# Patient Record
Sex: FEMALE
[De-identification: names, ages, dates, MRNs, and addresses within clinical notes are randomized per-mention and may not be internally consistent; named-entity substitution may affect disease eponyms.]

---

## 2019-12-10 ENCOUNTER — HOSPITAL ENCOUNTER (INPATIENT)
Dept: HOSPITAL 5 - 2B-ACE | Age: 69
LOS: 3 days | Discharge: HOME HEALTH | DRG: 374 | End: 2019-12-13
Attending: INTERNAL MEDICINE | Admitting: INTERNAL MEDICINE
Payer: MEDICARE

## 2019-12-10 DIAGNOSIS — E43: ICD-10-CM

## 2019-12-10 DIAGNOSIS — K59.00: ICD-10-CM

## 2019-12-10 DIAGNOSIS — J44.9: ICD-10-CM

## 2019-12-10 DIAGNOSIS — Z82.49: ICD-10-CM

## 2019-12-10 DIAGNOSIS — D50.8: ICD-10-CM

## 2019-12-10 DIAGNOSIS — C18.9: Primary | ICD-10-CM

## 2019-12-10 DIAGNOSIS — F17.210: ICD-10-CM

## 2019-12-10 DIAGNOSIS — E78.5: ICD-10-CM

## 2019-12-10 DIAGNOSIS — C56.9: ICD-10-CM

## 2019-12-10 DIAGNOSIS — I10: ICD-10-CM

## 2019-12-10 DIAGNOSIS — Z90.49: ICD-10-CM

## 2019-12-10 PROCEDURE — 36415 COLL VENOUS BLD VENIPUNCTURE: CPT

## 2019-12-10 PROCEDURE — 86304 IMMUNOASSAY TUMOR CA 125: CPT

## 2019-12-10 PROCEDURE — 80053 COMPREHEN METABOLIC PANEL: CPT

## 2019-12-10 PROCEDURE — 90686 IIV4 VACC NO PRSV 0.5 ML IM: CPT

## 2019-12-10 PROCEDURE — 80048 BASIC METABOLIC PNL TOTAL CA: CPT

## 2019-12-10 PROCEDURE — 87116 MYCOBACTERIA CULTURE: CPT

## 2019-12-10 PROCEDURE — 85610 PROTHROMBIN TIME: CPT

## 2019-12-10 PROCEDURE — 85025 COMPLETE CBC W/AUTO DIFF WBC: CPT

## 2019-12-10 PROCEDURE — 82378 CARCINOEMBRYONIC ANTIGEN: CPT

## 2019-12-10 PROCEDURE — 88305 TISSUE EXAM BY PATHOLOGIST: CPT

## 2019-12-10 PROCEDURE — 84134 ASSAY OF PREALBUMIN: CPT

## 2019-12-10 PROCEDURE — 85730 THROMBOPLASTIN TIME PARTIAL: CPT

## 2019-12-10 PROCEDURE — 74177 CT ABD & PELVIS W/CONTRAST: CPT

## 2019-12-10 PROCEDURE — 86301 IMMUNOASSAY TUMOR CA 19-9: CPT

## 2019-12-11 LAB
ALBUMIN SERPL-MCNC: 3.3 G/DL (ref 3.9–5)
ALT SERPL-CCNC: 72 UNITS/L (ref 7–56)
BASOPHILS # (AUTO): 0.1 K/MM3 (ref 0–0.1)
BASOPHILS NFR BLD AUTO: 0.9 % (ref 0–1.8)
BUN SERPL-MCNC: 11 MG/DL (ref 7–17)
BUN/CREAT SERPL: 28 %
CALCIUM SERPL-MCNC: 9.2 MG/DL (ref 8.4–10.2)
EOSINOPHIL # BLD AUTO: 0.2 K/MM3 (ref 0–0.4)
EOSINOPHIL NFR BLD AUTO: 1.7 % (ref 0–4.3)
HCT VFR BLD CALC: 37 % (ref 30.3–42.9)
HEMOLYSIS INDEX: 18
HGB BLD-MCNC: 11.7 GM/DL (ref 10.1–14.3)
INR PPP: 1.08 (ref 0.87–1.13)
LYMPHOCYTES # BLD AUTO: 2.8 K/MM3 (ref 1.2–5.4)
LYMPHOCYTES NFR BLD AUTO: 30.3 % (ref 13.4–35)
MCHC RBC AUTO-ENTMCNC: 32 % (ref 30–34)
MCV RBC AUTO: 75 FL (ref 79–97)
MONOCYTES # (AUTO): 0.6 K/MM3 (ref 0–0.8)
MONOCYTES % (AUTO): 6.5 % (ref 0–7.3)
PLATELET # BLD: 453 K/MM3 (ref 140–440)
PREALB SERPL-MCNC: 0.12 G/L (ref 0.2–0.4)
RBC # BLD AUTO: 4.91 M/MM3 (ref 3.65–5.03)

## 2019-12-11 PROCEDURE — 0DBN8ZX EXCISION OF SIGMOID COLON, VIA NATURAL OR ARTIFICIAL OPENING ENDOSCOPIC, DIAGNOSTIC: ICD-10-PCS | Performed by: INTERNAL MEDICINE

## 2019-12-11 RX ADMIN — Medication SCH ML: at 09:22

## 2019-12-11 RX ADMIN — SODIUM CHLORIDE SCH MLS/HR: 0.9 INJECTION, SOLUTION INTRAVENOUS at 13:40

## 2019-12-11 RX ADMIN — MORPHINE SULFATE PRN MG: 2 INJECTION, SOLUTION INTRAMUSCULAR; INTRAVENOUS at 22:35

## 2019-12-11 RX ADMIN — Medication SCH ML: at 22:36

## 2019-12-11 NOTE — OPERATIVE REPORT
Operative Report


Operative Report: 





Flexible sigmoidoscopy:





Procedure: Flexible sigmoidoscopy with biopsies





Endoscopist: Alexei Stewart MD





Pre-operative Diagnosis/Indications:abnormal CT, weight loss





Post-operative Diagnosis: distal sigmoid mass, poor prep





History:See consult note





Sedation:MAC





Procedure Details: Indications, risks, and benefits were explained and consent 

was obtained. Pt was placed in the left lateral decubitus position and sedated.

Video colonoscope was inserted thru the anus after digital exam, and advanced 

to the distal sigmoid colon. Scope was then gradually withdrawn with close 

inspection of the mucosa. Prep was poor.





Findings:


1. There was an obstructing mass-like lesion in the distal sigmoid colon at 

about 20 cm. Despite multiple attempts, the scope could not be advanced beyond 

this area. A complete visualization of the mass lesion could not be obtained due

to thicken surrounding mucosa and prep quality. Biopsies were obtained. The 

adjacent mucosa was marked with tattoo. 


2. Nonbleeding small internal hemorrhoids seen on retroflexion view.  





Specimens:sigmoid colon mass biopsies.





Complications:None; patient tolerated the procedure well.





Disposition:Recover in the GI endoscopy unit and transfer to the floor.





Impression:


1. There was an obstructing mass-like lesion in the distal sigmoid colon at 

about 20 cm. Despite multiple attempts, the scope could not be advanced beyond 

this area. A complete visualization of the mass lesion could not be obtained due

to thicken surrounding mucosa and prep quality. Biopsies were obtained. The 

adjacent mucosa was marked with tattoo. This lesion is concerning for malignancy

with possible mets as reported on CT done at [place]. Do not feel that there is 

complete obstruction due to this lesion.  


2. Nonbleeding small internal hemorrhoids seen on retroflexion view.  





Recommendations:


1. Resume clear liquid diet. 


2. Follow up pathology results. 


3. Discussed with surgery. Will place a consult. 


4. May need repeat or additional imaging if no access to the disc.

## 2019-12-11 NOTE — ANESTHESIA CONSULTATION
Anesthesia Consult and Med Hx


Date of service: 12/11/19





- Airway


Anesthetic Teeth Evaluation: Poor


ROM Head & Neck: Adequate


Mental/Hyoid Distance: Adequate


Mallampati Class: Class I


Intubation Access Assessment: Good





- Pulmonary Exam


CTA: Yes





- Cardiac Exam


Cardiac Exam: RRR





- Pre-Operative Health Status


ASA Pre-Surgery Classification: ASA3


Proposed Anesthetic Plan: MAC





- Pulmonary


Hx Smoking: Yes


COPD: Yes





- Cardiovascular System


Hx Hypertension: Yes





- Central Nervous System


Hx Neuromuscular Disorder: No


Hx Psychiatric Problems: No





- Gastrointestinal


Hx Gastroesophageal Reflux Disease: No





- Endocrine


Hx Renal Disease: No





- Other Systems


Hx Alcohol Use: No


Hx Substance Use: No


Hx Cancer: Yes (Ovarian Ca)





- Additional Comments


Anesthesia Medical History Comments: Patient denied previous anesthesia related 

complications.

## 2019-12-11 NOTE — CONSULTATION
History of Present Illness


Consult date: 12/11/19


Reason for consult: abdominal pain


Chief complaint: 





abdominal pain





- History of present illness


History of present illness: 





68 yo F with hx of presented to [facility] with weight loss, abdominal pain,

fatigue. Patient states she has been having periumbilical and right sided 

abdominal pain for several months. She has lost 40 lbs and has no appetite. She 

has one episode of emesis prior to presenting to [facility]. Her last solid BM was 

last Friday. At [facility], she underwent w/u with labs including CEA,  which 

were reported as high. She also had a CT scan A/P which was concerning for a 

necrotic right adnexal mass, thickening of the sigmoid colon, and a liver 

lesion. This was felt to be worrisome for metasatic ovarian ca. Per records, the

liver lesion wad felt to be artifact due to pleural thickening and adnexal 

lesion was not accessible by IR biopsy due to location. Colonoscopy was planned.

Pt was sent to [facility] for further care. She underwent colonoscopy today and was 

found to have a sigmoid mass that was not obstructing but could not be passed 

with the scope. Surgery is consulted for evaluation. Pt was able to tolerate a 

prep for cscope. She denies hematochezia, melena, hematemesis





Past History


Past Medical History: COPD, hypertension


Past Surgical History: appendectomy


Social history: smoking (Smokes less than a pack of cigarette daily)


Family history: other (Heart disease in mother)





Medications and Allergies


                                    Allergies











Allergy/AdvReac Type Severity Reaction Status Date / Time


 


No Known Allergies Allergy   Verified 12/11/19 05:57











                                Home Medications











 Medication  Instructions  Recorded  Confirmed  Last Taken  Type


 


Aspirin EC [Halfprin EC] 81 mg DAILY 12/11/19 12/11/19 Unknown History


 


AtorvaSTATin [Lipitor] 10 mg PO QHS 12/11/19 12/11/19 Unknown History


 


Chlorthalidone 50 mg DAILY 12/11/19 12/11/19 Unknown History











Active Meds: 


Active Medications





Acetaminophen (Tylenol)  650 mg PO Q4H PRN


   PRN Reason: Pain MILD(1-3)/Fever >100.5/HA


Sodium Chloride (Nacl 0.9% 1000 Ml)  1,000 mls @ 75 mls/hr IV AS DIRECT KEYLA


   Last Admin: 12/11/19 13:40 Dose:  75 mls/hr


   Documented by: 


Sodium Chloride (Nacl 0.9% 1000 Ml)  1,000 mls @ 50 mls/hr IV AS DIRECT KEYLA


Magnesium Citrate (Citrate Of Magnesia)  300 ml PO ONCE NR


   Stop: 12/12/19 14:00


   Last Admin: 12/11/19 11:26 Dose:  300 ml


   Documented by: 


Morphine Sulfate (Morphine)  2 mg IV Q4H PRN


   PRN Reason: Pain, Moderate (4-6)


Ondansetron HCl (Zofran)  4 mg IV Q8H PRN


   PRN Reason: Nausea And Vomiting


Sodium Chloride (Sodium Chloride Flush Syringe 10 Ml)  10 ml IV BID KEYLA


   Last Admin: 12/11/19 09:22 Dose:  10 ml


   Documented by: 


Sodium Chloride (Sodium Chloride Flush Syringe 10 Ml)  10 ml IV PRN PRN


   PRN Reason: LINE FLUSH











Review of Systems


All systems: negative (10 PT ROS performed and negative except for that listed 

in HPI)





Exam


                                   Vital Signs











Temp Pulse Resp BP Pulse Ox


 


 98.9 F   83   18   147/87   99 


 


 12/11/19 00:37  12/11/19 00:37  12/11/19 00:37  12/11/19 00:37  12/11/19 00:37











Narrative exam: 





Gen: AAOx3. NAD. Cachectic with temporal wasting


ENT: no scleral icterus or conjunctival pallor


CV: s1, S2+. tachy


Resp: even and unlabored


Abd: soft, mildly distended, R sided and periumbilical TTP. No r/r/g


Ext: no c/c/e





Assessment and Plan





68 yo F with sigmoid colon mass, possible metastatic disease








Plan:





All records from [facility] Reviewed. No CD available to review imaging.





1. Recommend repeat CT scan A/P with oral and IV contrast


2. Obtain CBC, BMP, CEA ,, 


[facility]. [facility]


4. nutrition consult


5. Further recs pending repeat imaging





Thank you, please call with questions.

## 2019-12-11 NOTE — PROGRESS NOTE
Subjective


Date of service: 12/11/19





Objective





- Constitutional


Vitals: 


                               Vital Signs - 12hr











  12/11/19 12/11/19 12/11/19





  12:36 13:00 13:30


 


Temperature  98.5 F 98.5 F


 


Pulse Rate  87 87


 


Respiratory  24 24





Rate   


 


Blood Pressure  148/89 148/89


 


O2 Sat by Pulse 99 99 99





Oximetry   














  12/11/19 12/11/19 12/11/19





  15:02 15:16 15:23


 


Temperature 97.5 F L  


 


Pulse Rate 73 78 73


 


Respiratory 19 13 14





Rate   


 


Blood Pressure 151/71 167/81 165/85


 


O2 Sat by Pulse 100 99 99





Oximetry   














  12/11/19 12/11/19 12/11/19





  15:32 15:55 16:01


 


Temperature   


 


Pulse Rate 80  77


 


Respiratory 24 15 13





Rate   


 


Blood Pressure 160/86  169/91


 


O2 Sat by Pulse 99  99





Oximetry   














  12/11/19 12/11/19





  16:06 16:19


 


Temperature  


 


Pulse Rate 76 78


 


Respiratory 18 20





Rate  


 


Blood Pressure 138/73 130/78


 


O2 Sat by Pulse 98 98





Oximetry

## 2019-12-11 NOTE — HISTORY AND PHYSICAL REPORT
History of Present Illness


Date of examination: 12/11/19


Date of admission: 


12/11/19 00:27





Chief complaint: 





Constipation and abdominal pain


History of present illness: 





69-year-old -American female with known history of hypertension 

hyperlipidemia COPD was transferred from [facility] today having been 

admitted for unintentional 40 pounds weight loss over a period of 1 month.  She 

had also complaining of abdominal and rectal pain and also constipation for 

about a week.


She has been prepared for colonoscopy and was subsequently transferred to this 

facility from [facility] for further care.  She currently denies any 

problems, denies any abdominal pain, no nausea vomiting.





Past History


Past Medical History: COPD, hypertension


Past Surgical History: appendectomy


Social history: smoking (Smokes less than a pack of cigarette daily)


Family history: other (Heart disease in mother)





Medications and Allergies


                                    Allergies











Allergy/AdvReac Type Severity Reaction Status Date / Time


 


No Known Allergies Allergy   Verified 12/11/19 05:57











Active Meds: 


Active Medications





Acetaminophen (Tylenol)  650 mg PO Q4H PRN


   PRN Reason: Pain MILD(1-3)/Fever >100.5/HA


Sodium Chloride (Nacl 0.9% 1000 Ml)  1,000 mls @ 75 mls/hr IV AS DIRECT KEYLA


Morphine Sulfate (Morphine)  2 mg IV Q4H PRN


   PRN Reason: Pain, Moderate (4-6)


Ondansetron HCl (Zofran)  4 mg IV Q8H PRN


   PRN Reason: Nausea And Vomiting


Sodium Chloride (Sodium Chloride Flush Syringe 10 Ml)  10 ml IV BID KEYLA


Sodium Chloride (Sodium Chloride Flush Syringe 10 Ml)  10 ml IV PRN PRN


   PRN Reason: LINE FLUSH











Review of Systems


Constitutional: weight loss


Gastrointestinal: constipation





Exam





- Constitutional


Vitals: 


                                        











Temp Pulse Resp BP Pulse Ox


 


 98.9 F   83   18   147/87   99 


 


 12/11/19 00:37  12/11/19 00:37  12/11/19 00:37  12/11/19 00:37  12/11/19 00:37











General appearance: Present: no acute distress, cachectic





- EENT


Eyes: Present: PERRL, EOM intact


ENT: hearing intact, clear oral mucosa





- Neck


Neck: Present: supple, normal ROM





- Respiratory


Respiratory: bilateral: CTA





- Cardiovascular


Rhythm: regular


Heart Sounds: Present: S1 & S2





- Extremities


Extremities: no ischemia, pulses intact, No edema, Full ROM


Peripheral Pulses: within normal limits





- Abdominal


General gastrointestinal: Present: soft, non-tender, non-distended





- Integumentary


Integumentary: Present: clear, warm, dry





- Musculoskeletal


Musculoskeletal: strength equal bilaterally





- Psychiatric


Psychiatric: appropriate mood/affect, intact judgment & insight, cooperative





- Neurologic


Neurologic: CNII-XII intact, moves all extremities





Assessment and Plan





- Patient Problems


(1) Constipation


Current Visit: Yes   Status: Acute   


Plan to address problem: 


Patient has taken some GoLYTELY and being currently prepared for colonoscopy.


We will request stat gastroenterology follow-up.








(2) Weight loss


Current Visit: Yes   Status: Acute   


Plan to address problem: 


Etiology is unclear.  We will monitor weight.


Imaging findings at [facility] was suspicious for metastatic malignancy and 

metastatic ovarian or colonic cancer.


Patient is being worked up for colonoscopy.








(3) Hypertension


Current Visit: Yes   Status: Acute   


Plan to address problem: 


Blood pressure stable.  We will monitor vital signs closely.  We will continue 

routine home medications.








(4) Full code status


Current Visit: Yes   Status: Acute

## 2019-12-11 NOTE — GASTROENTEROLOGY CONSULTATION
History of Present Illness





- Reason for Consult


Consult date: 12/11/19


constipation, wt loss, evaluate for colonoscopy


Requesting physician: STACIE WELLINGTON





- History of Present Illness


Patient is a 68 y/o female with PMH of HTN, HLD, and COPD who was transferred 

from [facility] [place] [facility] further care after being admitted for c/o abdominal 

pain, constipation, N/V, and unintentional weight loss over the past couple of 

months. Labs (NEY; CEA/CA 19-9 elevated) and Abd CT findings (adnexal lesion, 

sigmod thickening, hepatic lesion?, and adrenal lesion; CT chest with no overt 

metastatic disease) at [place] were concerning for metastatic malignancy (ovarian 

vs GI?) with patient pending further workup with a colonoscopy. This morning 

patient was resting in bed w/o acute distress. Reports continued abdominal pain 

but no current N/V. Drank 3/4 on colon prep (Golytely) overnight with last BM 

this am light watery brown (no solid stool). Denies signs of bleeding. No 

previous colonoscopy or Fhx of colon cancer. 








Past History


Past Medical History: COPD, hypertension


Past Surgical History: appendectomy


Social history: smoking (Smokes less than a pack of cigarette daily)


Family history: other (Heart disease in mother)





Medications and Allergies


                                    Allergies











Allergy/AdvReac Type Severity Reaction Status Date / Time


 


No Known Allergies Allergy   Verified 12/11/19 05:57











                                Home Medications











 Medication  Instructions  Recorded  Confirmed  Last Taken  Type


 


Aspirin EC [Halfprin EC] 81 mg DAILY 12/11/19 12/11/19 Unknown History


 


AtorvaSTATin [Lipitor] 10 mg PO QHS 12/11/19 12/11/19 Unknown History


 


Chlorthalidone 50 mg DAILY 12/11/19 12/11/19 Unknown History











Active Meds: 


Active Medications





Acetaminophen (Tylenol)  650 mg PO Q4H PRN


   PRN Reason: Pain MILD(1-3)/Fever >100.5/HA


Sodium Chloride (Nacl 0.9% 1000 Ml)  1,000 mls @ 75 mls/hr IV AS DIRECT KEYLA


Magnesium Citrate (Citrate Of Magnesia)  300 ml PO ONCE ONE


   Stop: 12/11/19 10:29


Morphine Sulfate (Morphine)  2 mg IV Q4H PRN


   PRN Reason: Pain, Moderate (4-6)


Ondansetron HCl (Zofran)  4 mg IV Q8H PRN


   PRN Reason: Nausea And Vomiting


Sodium Chloride (Sodium Chloride Flush Syringe 10 Ml)  10 ml IV BID KEYLA


   Last Admin: 12/11/19 09:22 Dose:  10 ml


   Documented by: 


Sodium Chloride (Sodium Chloride Flush Syringe 10 Ml)  10 ml IV PRN PRN


   PRN Reason: LINE FLUSH





medications reviewed/updated as required





Review of Systems





- Review of Systems


All systems: negative


Constitutional: weight loss


Gastrointestinal: abdominal pain, nausea, vomiting, constipation





Exam





- Constitutional


Vital Signs: 


                                        











Temp Pulse Resp BP Pulse Ox


 


 98.4 F   94 H  16   131/65   95 


 


 12/11/19 02:36  12/11/19 02:36  12/11/19 02:36  12/11/19 02:36  12/11/19 02:36











General appearance: no acute distress, cachectic





- EENT


Eyes: PERRL, EOM intact


ENT: hearing intact





- Respiratory


Respiratory effort: normal





- Cardiovascular


Rhythm: regular





- Gastrointestinal


General gastrointestinal: Present: soft, tender (slight TTP), distended 

(slightly), normal bowel sounds





- Integumentary


Integumentary: Present: warm, dry





- Neurologic


Neurological: alert and oriented x3





Assessment and Plan


1.abdominal pain


 2.N/V


 3.constipation


 4.iron deficiency anemia


 5.abnormal CT (concern for malignancy)





-afebrile


-H/H 11.3/36.1, ferritin 22 (12/9 at [place])-continue to monitor and transfuse as

 needed; no active signs of bleeding


-CEA/CA 19-9 elevated (at [place])


-abd CT (at [place]) showed adnexal lesion, sigmoid thickening, hepatic lesion?, 

and adrenal lesion with concerns for metastatic disease (CT chest w/o overt 

metastatic disease)


-per chart review, IR at [place] noted liver lesion too superficial and close to 

pleura for biopsy, also not convinced it is a liver lesion but could be a 

pleural thickening, and adnexal lesion appears cystic and necrotic in nature and

 would require surgical excision


-colonoscopy was recommended for further evaluation with patient given Golytely 

overnight (drank 3/4 with last BM with liquid light brown stool)


-will order repeat labs for today, give 1 bottle of mag citrate now, and 

schedule for colonoscopy this afternoon


-Keep NPO (okay for meds)


-continue supportive care


-further recommendations to follow pending colonoscopy results

## 2019-12-12 LAB
BASOPHILS # (AUTO): 0.1 K/MM3 (ref 0–0.1)
BASOPHILS NFR BLD AUTO: 0.9 % (ref 0–1.8)
BUN SERPL-MCNC: 10 MG/DL (ref 7–17)
BUN/CREAT SERPL: 25 %
CALCIUM SERPL-MCNC: 8.5 MG/DL (ref 8.4–10.2)
EOSINOPHIL # BLD AUTO: 0.2 K/MM3 (ref 0–0.4)
EOSINOPHIL NFR BLD AUTO: 3.3 % (ref 0–4.3)
HCT VFR BLD CALC: 32.8 % (ref 30.3–42.9)
HEMOLYSIS INDEX: 10
HGB BLD-MCNC: 10.6 GM/DL (ref 10.1–14.3)
LYMPHOCYTES # BLD AUTO: 2.3 K/MM3 (ref 1.2–5.4)
LYMPHOCYTES NFR BLD AUTO: 37 % (ref 13.4–35)
MCHC RBC AUTO-ENTMCNC: 32 % (ref 30–34)
MCV RBC AUTO: 75 FL (ref 79–97)
MONOCYTES # (AUTO): 0.7 K/MM3 (ref 0–0.8)
MONOCYTES % (AUTO): 10.8 % (ref 0–7.3)
PLATELET # BLD: 409 K/MM3 (ref 140–440)
RBC # BLD AUTO: 4.4 M/MM3 (ref 3.65–5.03)

## 2019-12-12 RX ADMIN — SODIUM CHLORIDE SCH MLS/HR: 0.9 INJECTION, SOLUTION INTRAVENOUS at 09:46

## 2019-12-12 RX ADMIN — Medication SCH ML: at 09:41

## 2019-12-12 RX ADMIN — Medication SCH ML: at 22:46

## 2019-12-12 RX ADMIN — MORPHINE SULFATE PRN MG: 2 INJECTION, SOLUTION INTRAMUSCULAR; INTRAVENOUS at 11:15

## 2019-12-12 RX ADMIN — SODIUM CHLORIDE SCH MLS/HR: 0.9 INJECTION, SOLUTION INTRAVENOUS at 22:45

## 2019-12-12 RX ADMIN — MORPHINE SULFATE PRN MG: 2 INJECTION, SOLUTION INTRAMUSCULAR; INTRAVENOUS at 19:43

## 2019-12-12 NOTE — PROGRESS NOTE
Assessment and Plan





68 yo F with possible sigmoid mass, right adnexal mass, weight loss





Ct scan A/P reviewed with Dr. Klein - irregular right adnexal mass, peritoneal 

and omental deposits and irrgeular thickening of sigmoid colon without 

obstruction - favoring metastatic ovarian cancer, less likely 2 primary cancers.





Plan:





1. adv to full liquid diet


2. prn pain control


3. oncology consult


4. follow up colonoscopy biopsy results


5. follow up tumor markers





At this time, no urgent surgical intervention is indicated. If patient does 

become obstructed, would consider diverting colostomy. She is NOT obstructed at 

this time.





Thank you, please call with questions.





Subjective


Date of service: 12/12/19


Narrative: 





Pt seen and examined. c/o right sided abdominal pain. No f/c, cp, sob, n/v. Has 

been tolerating clear liquid diet. Has been passing flatus and having loose BMs.





Objective


                               Vital Signs - 12hr











  12/12/19 12/12/19 12/12/19





  02:40 07:04 12:00


 


Temperature 97.6 F 98.9 F 


 


Pulse Rate  88 


 


Pulse Rate [   97 H





From Monitor]   


 


Respiratory 20 18 





Rate   


 


Blood Pressure 142/69 131/77 


 


O2 Sat by Pulse  97 98





Oximetry   














  12/12/19





  13:37


 


Temperature 98.4 F


 


Pulse Rate 96 H


 


Pulse Rate [ 





From Monitor] 


 


Respiratory 18





Rate 


 


Blood Pressure 142/73


 


O2 Sat by Pulse 97





Oximetry 














- General physical appearance


Narrative Exam: 





Gen; AAOx3. NAD


CV: S1, S2+


Resp: even and unlabored


Abd: soft, ND, mild R sided TTP. No r/r/g


Ext: no c/c/e





- Labs





                                 12/12/19 03:36





                                 12/12/19 03:36


                                 Diabetes panel











  12/11/19 12/12/19 Range/Units





  18:07 03:36 


 


Sodium  136 L  139  (137-145)  mmol/L


 


Potassium  3.9  3.5 L  (3.6-5.0)  mmol/L


 


Chloride  98.5  104.6  ()  mmol/L


 


Carbon Dioxide  21 L  25  (22-30)  mmol/L


 


BUN  11  10  (7-17)  mg/dL


 


Creatinine  0.4 L  0.4 L  (0.7-1.2)  mg/dL


 


Glucose  130 H  81  ()  mg/dL


 


Calcium  9.2  8.5  (8.4-10.2)  mg/dL


 


AST  81 H   (5-40)  units/L


 


ALT  72 H   (7-56)  units/L


 


Alkaline Phosphatase  115   ()  units/L


 


Total Protein  6.8   (6.3-8.2)  g/dL


 


Albumin  3.3 L   (3.9-5)  g/dL








                                  Calcium panel











  12/11/19 12/12/19 Range/Units





  18:07 03:36 


 


Calcium  9.2  8.5  (8.4-10.2)  mg/dL


 


Albumin  3.3 L   (3.9-5)  g/dL








                                 Pituitary panel











  12/11/19 12/12/19 Range/Units





  18:07 03:36 


 


Sodium  136 L  139  (137-145)  mmol/L


 


Potassium  3.9  3.5 L  (3.6-5.0)  mmol/L


 


Chloride  98.5  104.6  ()  mmol/L


 


Carbon Dioxide  21 L  25  (22-30)  mmol/L


 


BUN  11  10  (7-17)  mg/dL


 


Creatinine  0.4 L  0.4 L  (0.7-1.2)  mg/dL


 


Glucose  130 H  81  ()  mg/dL


 


Calcium  9.2  8.5  (8.4-10.2)  mg/dL








                                  Adrenal panel











  12/11/19 12/12/19 Range/Units





  18:07 03:36 


 


Sodium  136 L  139  (137-145)  mmol/L


 


Potassium  3.9  3.5 L  (3.6-5.0)  mmol/L


 


Chloride  98.5  104.6  ()  mmol/L


 


Carbon Dioxide  21 L  25  (22-30)  mmol/L


 


BUN  11  10  (7-17)  mg/dL


 


Creatinine  0.4 L  0.4 L  (0.7-1.2)  mg/dL


 


Glucose  130 H  81  ()  mg/dL


 


Calcium  9.2  8.5  (8.4-10.2)  mg/dL


 


Total Bilirubin  0.50   (0.1-1.2)  mg/dL


 


AST  81 H   (5-40)  units/L


 


ALT  72 H   (7-56)  units/L


 


Alkaline Phosphatase  115   ()  units/L


 


Total Protein  6.8   (6.3-8.2)  g/dL


 


Albumin  3.3 L   (3.9-5)  g/dL

## 2019-12-12 NOTE — PROGRESS NOTE
Assessment and Plan


Assessment and plan: 





Abdominal pain.  Abd CT (at [facility]) showed adnexal lesion, sigmoid thickening, 

hepatic lesion?, and adrenal lesion with concerns for metastatic disease (CT 

chest w/o overt metastatic disease).  Colonoscopy revealed an obstructing mass-

like lesion in the distal sigmoid colon at about 20 cm. Despite multiple 

attempts, the scope could not be advanced beyond this area. A complete visu

alization of the mass lesion could not be obtained due to thicken surrounding 

mucosa and prep quality. Biopsies were obtained. The adjacent mucosa was marked 

with tattoo. This lesion is concerning for malignancy with possible mets as 

reported on CT done at [facility]. Do not feel that there is complete obstruction due

to this lesion.  F/U path.  Surgery consult pending.  CEA/CA 19-9 elevated.  

CEA/CA 19-9 elevated (at [facility])





N/V.  Cont antiemetics.





Iron def anemia.  -H/H 11.3/36.1, ferritin 22 (12/9 at [facility])-continue to 

monitor and transfuse as needed; no active signs of bleeding





History


Interval history: 





No new issues overnight.





Hospitalist Physical





- Constitutional


Vitals: 


                                        











Temp Pulse Resp BP Pulse Ox


 


 98.9 F   88   18   131/77   97 


 


 12/12/19 07:04  12/12/19 07:04  12/12/19 07:04  12/12/19 07:04  12/12/19 07:04











General appearance: Present: no acute distress, cachectic





- EENT


Eyes: Present: PERRL, EOM intact


ENT: hearing intact, clear oral mucosa, dentition normal





- Neck


Neck: Present: supple, normal ROM





- Respiratory


Respiratory effort: normal


Respiratory: bilateral: CTA





- Cardiovascular


Rhythm: regular


Heart Sounds: Present: S1 & S2.  Absent: gallop, rub





- Extremities


Extremities: no ischemia, No edema, Full ROM





- Abdominal


General gastrointestinal: soft, non-tender, non-distended, normal bowel sounds





- Integumentary


Integumentary: Present: clear, warm, dry





- Neurologic


Neurologic: CNII-XII intact, moves all extremities





Results





- Labs


CBC & Chem 7: 


                                 12/12/19 03:36





                                 12/12/19 03:36


Labs: 


                             Laboratory Last Values











WBC  6.2 K/mm3 (4.5-11.0)   12/12/19  03:36    


 


RBC  4.40 M/mm3 (3.65-5.03)   12/12/19  03:36    


 


Hgb  10.6 gm/dl (10.1-14.3)   12/12/19  03:36    


 


Hct  32.8 % (30.3-42.9)   12/12/19  03:36    


 


MCV  75 fl (79-97)  L  12/12/19  03:36    


 


MCH  24 pg (28-32)  L  12/12/19  03:36    


 


MCHC  32 % (30-34)   12/12/19  03:36    


 


RDW  17.1 % (13.2-15.2)  H  12/12/19  03:36    


 


Plt Count  409 K/mm3 (140-440)   12/12/19  03:36    


 


Lymph % (Auto)  37.0 % (13.4-35.0)  H  12/12/19  03:36    


 


Mono % (Auto)  10.8 % (0.0-7.3)  H  12/12/19  03:36    


 


Eos % (Auto)  3.3 % (0.0-4.3)   12/12/19  03:36    


 


Baso % (Auto)  0.9 % (0.0-1.8)   12/12/19  03:36    


 


Lymph #  2.3 K/mm3 (1.2-5.4)   12/12/19  03:36    


 


Mono #  0.7 K/mm3 (0.0-0.8)   12/12/19  03:36    


 


Eos #  0.2 K/mm3 (0.0-0.4)   12/12/19  03:36    


 


Baso #  0.1 K/mm3 (0.0-0.1)   12/12/19  03:36    


 


Seg Neutrophils %  48.0 % (40.0-70.0)   12/12/19  03:36    


 


Seg Neutrophils #  3.0 K/mm3 (1.8-7.7)   12/12/19  03:36    


 


PT  14.1 Sec. (12.2-14.9)   12/11/19  18:07    


 


INR  1.08  (0.87-1.13)   12/11/19  18:07    


 


APTT  31.5 Sec. (24.2-36.6)   12/12/19  03:36    


 


Sodium  139 mmol/L (137-145)   12/12/19  03:36    


 


Potassium  3.5 mmol/L (3.6-5.0)  L  12/12/19  03:36    


 


Chloride  104.6 mmol/L ()   12/12/19  03:36    


 


Carbon Dioxide  25 mmol/L (22-30)   12/12/19  03:36    


 


Anion Gap  13 mmol/L  12/12/19  03:36    


 


BUN  10 mg/dL (7-17)   12/12/19  03:36    


 


Creatinine  0.4 mg/dL (0.7-1.2)  L  12/12/19  03:36    


 


Estimated GFR  > 60 ml/min  12/12/19  03:36    


 


BUN/Creatinine Ratio  25 %  12/12/19  03:36    


 


Glucose  81 mg/dL ()   12/12/19  03:36    


 


Calcium  8.5 mg/dL (8.4-10.2)   12/12/19  03:36    


 


Total Bilirubin  0.50 mg/dL (0.1-1.2)   12/11/19  18:07    


 


AST  81 units/L (5-40)  H  12/11/19  18:07    


 


ALT  72 units/L (7-56)  H  12/11/19  18:07    


 


Alkaline Phosphatase  115 units/L ()   12/11/19  18:07    


 


Total Protein  6.8 g/dL (6.3-8.2)   12/11/19  18:07    


 


Albumin  3.3 g/dL (3.9-5)  L  12/11/19  18:07    


 


Albumin/Globulin Ratio  0.9 %  12/11/19  18:07    


 


Prealbumin  0.116 g/L (0.200-0.400)  L  12/11/19  18:07    














Active Medications





- Current Medications


Current Medications: 














Generic Name Dose Route Start Last Admin





  Trade Name Freq  PRN Reason Stop Dose Admin


 


Acetaminophen  650 mg  12/11/19 01:46 





  Tylenol  PO  





  Q4H PRN  





  Pain MILD(1-3)/Fever >100.5/HA  


 


Sodium Chloride  1,000 mls @ 75 mls/hr  12/11/19 02:00  12/12/19 09:46





  Nacl 0.9% 1000 Ml  IV   75 mls/hr





  AS DIRECT KEYLA   Administration


 


Sodium Chloride  1,000 mls @ 50 mls/hr  12/11/19 13:45 





  Nacl 0.9% 1000 Ml  IV  





  AS DIRECT KEYLA  


 


Magnesium Citrate  300 ml  12/11/19 11:00  12/11/19 11:26





  Citrate Of Magnesia  PO  12/12/19 14:00  300 ml





  ONCE NR   Administration


 


Morphine Sulfate  2 mg  12/11/19 01:46  12/11/19 22:35





  Morphine  IV   2 mg





  Q4H PRN   Administration





  Pain, Moderate (4-6)  


 


Ondansetron HCl  4 mg  12/11/19 01:46 





  Zofran  IV  





  Q8H PRN  





  Nausea And Vomiting  


 


Sodium Chloride  10 ml  12/11/19 10:00  12/12/19 09:41





  Sodium Chloride Flush Syringe 10 Ml  IV   10 ml





  BID KEYLA   Administration


 


Sodium Chloride  10 ml  12/11/19 01:46 





  Sodium Chloride Flush Syringe 10 Ml  IV  





  PRN PRN  





  LINE FLUSH  














Nutrition/Malnutrition Assess





- Dietary Evaluation


Nutrition/Malnutrition Findings: 


                                        





Nutrition Notes                                            Start:  12/11/19 

09:32


Freq:                                                      Status: Active       




Protocol:                                                                       




 Document     12/11/19 09:32  PS  (Rec: 12/11/19 10:49  PS  [id number])


 Co-Sign      12/11/19 09:32  LM


 Nutrition Notes


     Need for Assessment generated from:         Low BMI


     Initial or Follow up                        Brief Note


     Height                                      5 ft 4 in


     Ideal Body Weight (kg)                      54.54


     Subjective/Other Information                Pt. did not have ht in the


                                                 chart. Pt states she is 66 in


                                                 tall.

## 2019-12-12 NOTE — GASTROENTEROLOGY PROGRESS NOTE
Assessment and Plan


1.abdominal pain


 2.N/V


 3.constipation


 4.iron deficiency anemia


 5.abnormal CT (concern for malignancy with metastatic disease)





-afebrile


-H/H 10.6/32.8, ferritin 22-continue to monitor and transfuse as needed; no 

active signs of bleeding


-CEA/CA 19-9 elevated (at [facility])- repeat tumor markers pending


-abd CT (at [facility]) showed adnexal lesion, sigmoid thickening, hepatic lesion?, 

and adrenal lesion with concerns for metastatic disease (CT chest w/o overt 

metastatic disease)


-per chart review, IR at [facility] noted liver lesion too superficial and close to 

pleura for biopsy, also not convinced it is a liver lesion but could be a 

pleural thickening, and adnexal lesion appears cystic and necrotic in nature and

would require surgical excision


-s/p attempted colonoscopy yesterday with results showing an obstructing mass-

like lesion in the distal sigmoid at ~20cm (scope could not be advanced beyong 

this area; adjacent mucosa marked with tattoo) concerning for malignancy 


-bx results pending- if + malignancy, recommend oncology consult


-surgery following with repeat abd CT scan pending for today


-continue supportive care


-no further GI recommendations at this time


-will sign off and defer further management per surgery/primary team


-please call if needed











Subjective


Date of service: 12/12/19


Principal diagnosis: evalate for colonoscopy, abd pain, constipation


Interval history: 


No acute distress. Reports continued abdominal pain. No N/V or signs of 

bleeding. Requesting PO. 








Objective





- Constitutional


Vitals: 


                                        











Temp Pulse Resp BP Pulse Ox


 


 98.9 F   88   18   131/77   97 


 


 12/12/19 07:04  12/12/19 07:04  12/12/19 07:04  12/12/19 07:04  12/12/19 07:04











General appearance: no acute distress, cachectic





- EENT


Eyes: PERRL, EOM intact


ENT: hearing intact





- Respiratory


Respiratory effort: normal





- Cardiovascular


Rhythm: regular





- Gastrointestinal


General gastrointestinal: Present: soft, tender, distended (slightly), normal 

bowel sounds





- Neurologic


Neurological: alert and oriented x3





- Labs


CBC & Chem 7: 


                                 12/12/19 03:36





                                 12/12/19 03:36


Labs: 


                         Laboratory Results - last 24 hr











  12/11/19 12/11/19 12/11/19





  18:07 18:07 18:08


 


WBC    9.2


 


RBC    4.91


 


Hgb    11.7


 


Hct    37.0


 


MCV    75 L


 


MCH    24 L


 


MCHC    32


 


RDW    17.7 H


 


Plt Count    453 H


 


Lymph % (Auto)    30.3


 


Mono % (Auto)    6.5


 


Eos % (Auto)    1.7


 


Baso % (Auto)    0.9


 


Lymph #    2.8


 


Mono #    0.6


 


Eos #    0.2


 


Baso #    0.1


 


Seg Neutrophils %    60.6


 


Seg Neutrophils #    5.6


 


PT  14.1  


 


INR  1.08  


 


APTT   


 


Sodium   136 L 


 


Potassium   3.9 


 


Chloride   98.5 


 


Carbon Dioxide   21 L 


 


Anion Gap   20 


 


BUN   11 


 


Creatinine   0.4 L 


 


Estimated GFR   > 60 


 


BUN/Creatinine Ratio   28 


 


Glucose   130 H 


 


Calcium   9.2 


 


Total Bilirubin   0.50 


 


AST   81 H 


 


ALT   72 H 


 


Alkaline Phosphatase   115 


 


Total Protein   6.8 


 


Albumin   3.3 L 


 


Albumin/Globulin Ratio   0.9 


 


Prealbumin   0.116 L 














  12/12/19 12/12/19 12/12/19





  03:36 03:36 03:36


 


WBC   6.2 


 


RBC   4.40 


 


Hgb   10.6 


 


Hct   32.8 


 


MCV   75 L 


 


MCH   24 L 


 


MCHC   32 


 


RDW   17.1 H 


 


Plt Count   409 


 


Lymph % (Auto)   37.0 H 


 


Mono % (Auto)   10.8 H 


 


Eos % (Auto)   3.3 


 


Baso % (Auto)   0.9 


 


Lymph #   2.3 


 


Mono #   0.7 


 


Eos #   0.2 


 


Baso #   0.1 


 


Seg Neutrophils %   48.0 


 


Seg Neutrophils #   3.0 


 


PT   


 


INR   


 


APTT  31.5  


 


Sodium    139


 


Potassium    3.5 L


 


Chloride    104.6


 


Carbon Dioxide    25


 


Anion Gap    13


 


BUN    10


 


Creatinine    0.4 L


 


Estimated GFR    > 60


 


BUN/Creatinine Ratio    25


 


Glucose    81


 


Calcium    8.5


 


Total Bilirubin   


 


AST   


 


ALT   


 


Alkaline Phosphatase   


 


Total Protein   


 


Albumin   


 


Albumin/Globulin Ratio   


 


Prealbumin

## 2019-12-12 NOTE — EVENT NOTE
Date: 12/11/19


Early AM admission





Colonoscopy findings;


1. There was an obstructing mass-like lesion in the distal sigmoid colon at 

about 20 cm. Despite multiple attempts, the scope could not be advanced beyond 

this area. A complete visualization of the mass lesion could not be obtained due

to thicken surrounding mucosa and prep quality. Biopsies were obtained. The 

adjacent mucosa was marked with tattoo. This lesion is concerning for malignancy

with possible mets as reported on CT done at [place]. Do not feel that there is 

complete obstruction due to this lesion.  


2. Nonbleeding small internal hemorrhoids seen on retroflexion view.  





Recommendations:


1. Resume clear liquid diet. 


2. Follow up pathology results. 


3. Surgery consult. 


4. May need repeat or additional imaging if no access to the disc.

## 2019-12-13 VITALS — SYSTOLIC BLOOD PRESSURE: 109 MMHG | DIASTOLIC BLOOD PRESSURE: 67 MMHG

## 2019-12-13 LAB
BASOPHILS # (AUTO): 0 K/MM3 (ref 0–0.1)
BASOPHILS NFR BLD AUTO: 0.8 % (ref 0–1.8)
BUN SERPL-MCNC: 10 MG/DL (ref 7–17)
BUN/CREAT SERPL: 20 %
CALCIUM SERPL-MCNC: 8.5 MG/DL (ref 8.4–10.2)
EOSINOPHIL # BLD AUTO: 0.2 K/MM3 (ref 0–0.4)
EOSINOPHIL NFR BLD AUTO: 3.2 % (ref 0–4.3)
HCT VFR BLD CALC: 35.8 % (ref 30.3–42.9)
HEMOLYSIS INDEX: 6
HGB BLD-MCNC: 11.5 GM/DL (ref 10.1–14.3)
LYMPHOCYTES # BLD AUTO: 1.6 K/MM3 (ref 1.2–5.4)
LYMPHOCYTES NFR BLD AUTO: 30 % (ref 13.4–35)
MCHC RBC AUTO-ENTMCNC: 32 % (ref 30–34)
MCV RBC AUTO: 75 FL (ref 79–97)
MONOCYTES # (AUTO): 0.6 K/MM3 (ref 0–0.8)
MONOCYTES % (AUTO): 11.8 % (ref 0–7.3)
PLATELET # BLD: 434 K/MM3 (ref 140–440)
RBC # BLD AUTO: 4.75 M/MM3 (ref 3.65–5.03)

## 2019-12-13 RX ADMIN — Medication SCH ML: at 13:07

## 2019-12-13 RX ADMIN — MORPHINE SULFATE PRN MG: 2 INJECTION, SOLUTION INTRAMUSCULAR; INTRAVENOUS at 05:26

## 2019-12-13 NOTE — EVENT NOTE
Date: 12/13/19





Patient being discharged today. She appears to be in great spirits and looking 

forward to going home. No pain, n/v. Tolerating soft diet. Patient advised to 

follow up with Dr. Tenorio as soon as possible for path results as well as gyn 

oncology.

## 2019-12-13 NOTE — EVENT NOTE
Date: 12/13/19





Notified by pathology that the sigmoid colon mass path came back as primary 

colon adenocarcinoma. Notified Dr. Tenorio with oncology and Dr. Caraballo with 

surgery. Patient discharged with plans to follow up with oncology.

## 2019-12-13 NOTE — DISCHARGE SUMMARY
Providers





- Providers


Date of Admission: 


12/11/19 00:27





Date of discharge: 12/13/19


Attending physician: 


JOANN MUNOZ





                                        





12/11/19 01:46


Consult to Physician [CONS] Routine 


   Comment: 


   Consulting Provider: DYAN VIGIL


   Physician Instructions: 


   Reason For Exam: constipation, weight loss.evaluate for colonoscopy





12/11/19 11:04


Physical Therapy Evaluation and Treat [CONS] Routine 


   Comment: 


   Reason For Exam: weakness





12/11/19 15:16


Consult to Physician [CONS] Routine 


   Comment: 


   Consulting Provider: ESTEVAN QUESADA


   Physician Instructions: 


   Reason For Exam: sigmoid mass





12/11/19 16:14


Consult to Dietitian/Nutrition [CONS] Routine 


   Physician Instructions: 


   Reason For Exam: 


   Reason for Consult: weight loss, malnutrition





12/12/19 16:29


Consult to Physician [CONS] Routine 


   Comment: 


   Consulting Provider: JOSEF TENORIO


   Physician Instructions: 


   Reason For Exam: metastatic ovarian cancer with colon involvement











Primary care physician: 


PRIMARY CARE MD








Hospitalization


Reason for admission: abd pain


Hospital course: 





68 yo F with hx of presented to [facility] with weight loss, abdominal pain,

 fatigue. Patient stated she had been having periumbilical and right sided 

abdominal pain for several months. She has lost 40 lbs and has no appetite. At 

[facility], she underwent w/u with labs including CEA,  which were reported as 

high. She also had a CT scan A/P which was concerning for a necrotic right 

adnexal mass, thickening of the sigmoid colon, and a liver lesion. This was felt

 to be worrisome for metasatic ovarian ca. Per records, the liver lesion was 

felt to be artifact due to pleural thickening and adnexal lesion was not 

accessible by IR biopsy due to location.  Pt was sent to [facility] for further care. 

She underwent colonoscopy on 12/11/19 and was found to have a sigmoid mass that 

was not obstructing but could not be passed with the scope. Surgery was 

consulted and recommended repeat CT scan which revealed  irregular right adnexal

 mass, peritoneal and omental deposits and irrgeular thickening of sigmoid colon

 without obstruction - favoring metastatic ovarian cancer, less likely 2 primary

 cancers.  Surgery reported at this time, no urgent surgical intervention is 

indicated. If patient does become obstructed, would consider diverting 

colostomy. She is NOT obstructed at this time, therefore d/c home.  Patient is 

to follow-up with oncology, Dr. Tenorio for path results and possible follow-up 

GYN oncology if pathology is ovarian.





Disposition: DC-30 STILL A PATIENT


Time spent for discharge: 32





- Discharge Diagnoses


(1) Colonic mass


Status: Acute   





(2) Constipation


Status: Acute   





(3) Hypertension


Status: Acute   





(4) Weight loss


Status: Acute   





Core Measure Documentation





- Palliative Care


Palliative Care/ Comfort Measures: Not Applicable





- Core Measures


Any of the following diagnoses?: none





Exam





- Constitutional


Vitals: 


                                        











Temp Pulse Resp BP Pulse Ox


 


 98.6 F   80   20   109/67   97 


 


 12/13/19 01:51  12/13/19 07:21  12/13/19 05:56  12/13/19 08:52  12/13/19 07:21











General appearance: Present: no acute distress, well-nourished





- EENT


Eyes: Present: PERRL


ENT: hearing intact, clear oral mucosa





- Neck


Neck: Present: supple, normal ROM





- Respiratory


Respiratory effort: normal


Respiratory: bilateral: CTA





- Cardiovascular


Heart Sounds: Present: S1 & S2.  Absent: rub, click





- Extremities


Extremities: pulses symmetrical, No edema


Peripheral Pulses: within normal limits





- Abdominal


General gastrointestinal: Present: soft, non-tender, non-distended, normal bowel

 sounds


Female genitourinary: Present: normal





- Integumentary


Integumentary: Present: clear, warm, dry





- Musculoskeletal


Musculoskeletal: gait normal, strength equal bilaterally





- Psychiatric


Psychiatric: appropriate mood/affect, intact judgment & insight





- Neurologic


Neurologic: CNII-XII intact, moves all extremities





Plan


Activity: advance as tolerated


Weight Bearing Status: Weight Bear as Tolerated


Diet: regular


Follow up with: 


PRIMARY CARE,MD [Primary Care Provider] - 7 Days


ESTEVAN QUESADA DO [Staff Physician] - 7 Days


JOSEF TENORIO MD [Staff Physician] - 7 Days


Prescriptions: 


AtorvaSTATin 10 mg PO QHS #30 tab


Chlorthalidone 50 mg PO DAILY #30 tab


Aspirin EC [Halfprin EC] 81 mg PO DAILY #30


oxyCODONE /ACETAMINOPHEN [Percocet 5/325] 1 tab PO Q4HR #12 tab

## 2019-12-16 NOTE — CONSULTATION
REFERRED BY:  Dr. Diaz.



REASON FOR CONSULTATION:  Ovarian versus colon cancer.



HISTORY OF PRESENT ILLNESS:  I saw the patient, a 69-year-old female on the

medical floor.  The patient has history of hypertension, hyperlipidemia, COPD. 

The patient was at [facility] from where she was transferred.  She has

history of weight loss.  She also had abdominal discomfort and rectal pain for

about a week.  During this admission, the patient was seen by the surgical team

and GI team.  There was a question if this is some advanced ovarian cancer

versus colon cancer.  The patient had colonoscopy done.  Biopsy is pending.  I

have been asked to evaluate the patient due to these issues.  The patient is

keen to go home.  At this time, no headache, no visual disturbances, no ear

discharge, no chest pain, no shortness of breath at rest.  Has abdominal pain. 

Has loss of weight.  No hematemesis, no hematochezia.



PAST MEDICAL HISTORY:  COPD, hypertension.



PAST SURGICAL HISTORY:  Appendix surgery.



SOCIAL HISTORY:  History of smoking present.



FAMILY HISTORY:  Heart disease.



ALLERGIES:  None.



MEDICATIONS:  Reviewed.



PHYSICAL EXAMINATION:

VITAL SIGNS:  Temperature ____, pulse 81, respirations 18, /67.

HEENT:  No pallor, no icterus.

NECK:  No neck lymph nodes.

HEART:  S1, S2.

LUNGS:  Clear to auscultation anteriorly.

ABDOMEN:  Soft, no rebound.

EXTREMITIES:  No calf tenderness.

NEUROLOGIC:  Alert, awake.



LABORATORY DATA:  White cells 5, hemoglobin 11, MCV 75, platelets 134. 

Potassium 3.1, creatinine 0.5, calcium 8.5 and bilirubin 0.5.  CA-125 of 159.



RADIOLOGY:  Abdomen and pelvis CT, complex partially cystic mass in the pelvis

with a nodular enhancement 8.4 cm, peritoneal deposits which is 3.9 cm, sigmoid

colon thickening.  Liver is unremarkable.  No bone lesions were found. 

Radiologically, metastatic ovarian cancer, sigmoid colon adenomas.  Pathology,

adenocarcinoma compatible with colon primary.



ASSESSMENT AND PLAN:

1.  Sigmoid mass.

2.  Peritoneal mass.

3.  Pathology shows colon cancer.

4.  ____ presumed to be cancer.

5.  Elevated CA-125.

6.  History of weight loss.

7.  I will follow the patient during inpatient stay and then in the clinic

setting.

8.  ____ MCV.  The patient prefers to go back to [facility] for followup.  I spoke

with the surgical team and GI team to evaluate the patient.





DD: 12/15/2019 13:05

DT: 12/16/2019 00:08

JOB# [id number]  [id number]

NM/AIDEN